# Patient Record
Sex: FEMALE | Race: WHITE | NOT HISPANIC OR LATINO | URBAN - METROPOLITAN AREA
[De-identification: names, ages, dates, MRNs, and addresses within clinical notes are randomized per-mention and may not be internally consistent; named-entity substitution may affect disease eponyms.]

---

## 2018-03-21 ENCOUNTER — EMERGENCY (EMERGENCY)
Facility: HOSPITAL | Age: 53
LOS: 1 days | Discharge: HOME | End: 2018-03-21
Attending: EMERGENCY MEDICINE
Payer: COMMERCIAL

## 2018-03-21 VITALS — HEIGHT: 64 IN | WEIGHT: 184.97 LBS

## 2018-03-21 VITALS
SYSTOLIC BLOOD PRESSURE: 138 MMHG | DIASTOLIC BLOOD PRESSURE: 86 MMHG | HEART RATE: 134 BPM | RESPIRATION RATE: 16 BRPM | OXYGEN SATURATION: 94 % | TEMPERATURE: 98 F

## 2018-03-21 PROCEDURE — 93931 UPPER EXTREMITY STUDY: CPT

## 2018-03-21 PROCEDURE — 99284 EMERGENCY DEPT VISIT MOD MDM: CPT | Mod: 25

## 2018-03-21 PROCEDURE — 93931 UPPER EXTREMITY STUDY: CPT | Mod: 26,LT

## 2018-03-21 PROCEDURE — 99284 EMERGENCY DEPT VISIT MOD MDM: CPT

## 2018-03-21 NOTE — ED PROVIDER NOTE - OBJECTIVE STATEMENT
51 y/o F with PMHx of discoid lupus (not followed by rheumatologist) presents to ED c/o L hand pain, numbness, tingling that radiates into the arm and shoulder onset several months ago that has worsened over the past 2 days. She states her hand has been turning blue, which prompted she come to the ED. Pt was seen by orthopedist, has had cortisone shots, MRI and was told she has tendonitis. Was also seen by neurologist, who suggest she get an MRI of her brain but she did not have imaging done. Was taking Gabapentin, but stopped because it provided no relief. NKDA. Denies FHx of clotting problems, fevers, chills, deformity of the hand, neck pain, back pain, CP, SOB or recent trauma. No further complaints at this time. 51 y/o F with PMHx of discoid lupus (not followed by rheumatologist) presents to ED c/o L hand pain, numbness, tingling that radiates into the arm and shoulder onset several months ago that has worsened over the past 2 days. She states her hand has been turning blue and feels colder than he R hand, which prompted she come to the ED. Pt was seen by orthopedist, has had cortisone shots, MRI and was told she has tendonitis. Was also seen by neurologist, who suggest she get an MRI of her brain but she did not have imaging done. Was taking Gabapentin, but stopped because it provided no relief. NKDA. Denies FHx of clotting problems, fevers, chills, deformity of the hand, neck pain, back pain, CP, SOB or recent trauma. No further complaints at this time. 51 y/o F with PMHx of discoid lupus (not followed by rheumatologist) presents to ED c/o L hand pain, numbness, tingling that radiates into the arm and shoulder onset in November that has worsened over the past 2 days. She states her hand has been turning blue and feels colder than he R hand, which prompted she come to the ED today. Pt was seen by orthopedist, has had cortisone shots (with no relief), an MRI and was told she has tendonitis. Was also seen by neurologist, who suggest she get an MRI of her brain but she did not have imaging done. Was taking Gabapentin, but stopped because it provided no relief. NKDA. Denies FHx or PMHx of clotting problems, fevers, chills, deformity of the hand, neck pain, back pain, CP, SOB or recent trauma. No further complaints at this time.

## 2018-03-21 NOTE — ED ADULT TRIAGE NOTE - CHIEF COMPLAINT QUOTE
pt presents to ED with left forearm pain and discoloration of arm turning blue at times x 5 months. pt has seen PMD and has MRI's with negative findings. pt has seen a neurologist with negative findings. no discoloration noted at this time

## 2018-03-21 NOTE — ED PROVIDER NOTE - NEUROLOGICAL, MLM
Alert and oriented, no focal deficits, no motor or sensory deficits. Neurovascularly intact. Reflexes, sensation and strength intact. Vascular supply demonstrated.
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
## 2018-03-21 NOTE — ED PROVIDER NOTE - ATTENDING CONTRIBUTION TO CARE
Pt. well appearing. NO focal deficit. Normal gait. NO weakness to upper extremities. I have discussed the plan with the ACP.

## 2018-03-21 NOTE — ED PROVIDER NOTE - MUSCULOSKELETAL, MLM
Spine appears normal, range of motion is not limited, no muscle or joint tenderness. Can move all extremities.

## 2018-03-21 NOTE — ED PROVIDER NOTE - PROGRESS NOTE DETAILS
PT seen resting comfortably in no acute distress, no acute complaints at this time, PT tolerating PO intake,  PT informed of all results, pt informed of possibility of change in radiology read after official read, PT will be DC home with follow up to rhumartalogy, vascular, pt educated about need for further out pt evaluation. PT educated about when to return to the ED if needed. PT verbalizes that he understands all instructions and results

## 2018-03-21 NOTE — ED PROVIDER NOTE - SKIN, MLM
Skin normal color for race, warm, dry and intact. No evidence of rash. No signs of trauma or ecchymosis, erythema or edema. Skin normal color for race, dry and intact. No evidence of rash. No signs of trauma or ecchymosis or edema. L hand is slightly more cool to the touch than R hand. Skin normal color for race, dry and intact. No evidence of rash. No signs of trauma or ecchymosis or edema. L hand is slightly more cool to the touch and erythematous than R hand.

## 2018-05-25 ENCOUNTER — EMERGENCY (EMERGENCY)
Facility: HOSPITAL | Age: 53
LOS: 1 days | Discharge: HOME | End: 2018-05-25
Attending: EMERGENCY MEDICINE
Payer: COMMERCIAL

## 2018-05-25 VITALS
DIASTOLIC BLOOD PRESSURE: 83 MMHG | RESPIRATION RATE: 16 BRPM | SYSTOLIC BLOOD PRESSURE: 145 MMHG | WEIGHT: 175.93 LBS | OXYGEN SATURATION: 98 % | HEART RATE: 74 BPM | HEIGHT: 65 IN | TEMPERATURE: 98 F

## 2018-05-25 PROCEDURE — 99283 EMERGENCY DEPT VISIT LOW MDM: CPT

## 2018-05-25 PROCEDURE — 70330 X-RAY EXAM OF JAW JOINTS: CPT

## 2018-05-25 PROCEDURE — 99284 EMERGENCY DEPT VISIT MOD MDM: CPT

## 2018-05-25 PROCEDURE — 73130 X-RAY EXAM OF HAND: CPT | Mod: 26,LT

## 2018-05-25 PROCEDURE — 73130 X-RAY EXAM OF HAND: CPT

## 2018-05-25 PROCEDURE — 70330 X-RAY EXAM OF JAW JOINTS: CPT | Mod: 26

## 2018-05-25 RX ORDER — ACETAMINOPHEN 500 MG
650 TABLET ORAL ONCE
Qty: 0 | Refills: 0 | Status: COMPLETED | OUTPATIENT
Start: 2018-05-25 | End: 2018-05-25

## 2018-05-25 RX ORDER — IBUPROFEN 200 MG
600 TABLET ORAL ONCE
Qty: 0 | Refills: 0 | Status: COMPLETED | OUTPATIENT
Start: 2018-05-25 | End: 2018-05-25

## 2018-05-25 RX ADMIN — Medication 600 MILLIGRAM(S): at 17:32

## 2018-05-25 RX ADMIN — Medication 650 MILLIGRAM(S): at 18:31
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
## 2018-05-25 NOTE — ED PROVIDER NOTE - CONSTITUTIONAL, MLM
normal... Well appearing, well nourished, awake, alert, oriented to person, place, time/situation and in mild emotional distress
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
## 2018-05-25 NOTE — ED PROVIDER NOTE - SKIN, MLM
5x4 abrasion to RLE. contusion to left posterior lower extremity distally. skin abrasion to right distal medial forearm. contusion to dorsal surface of right hand medially, tender no deformity

## 2019-04-28 NOTE — ED ADULT NURSE NOTE - MUSCULOSKELETAL ASSESSMENT
1045:  Pt arrived ambulatory and in no distress for invanz iv. Assessment completed. No new complaints voiced. Vital Signs Patient Vitals for the past 12 hrs: 
 Temp Pulse Resp BP  
04/28/19 1047 97 °F (36.1 °C) 63 16 150/81 Medications: 
Invanz 1g IV over 30 mins 1115: Discharged home ambulatory and in no distress, accompanied by .  Next appointment 4/29/19 4pm. 
 WDL

## 2019-07-01 ENCOUNTER — APPOINTMENT (OUTPATIENT)
Dept: ORTHOPEDIC SURGERY | Facility: CLINIC | Age: 54
End: 2019-07-01

## 2019-08-02 ENCOUNTER — OUTPATIENT (OUTPATIENT)
Dept: OUTPATIENT SERVICES | Facility: HOSPITAL | Age: 54
LOS: 1 days | End: 2019-08-02
Payer: COMMERCIAL

## 2019-08-02 VITALS
OXYGEN SATURATION: 98 % | SYSTOLIC BLOOD PRESSURE: 129 MMHG | RESPIRATION RATE: 16 BRPM | HEART RATE: 72 BPM | TEMPERATURE: 98 F | DIASTOLIC BLOOD PRESSURE: 67 MMHG | WEIGHT: 188.94 LBS

## 2019-08-02 DIAGNOSIS — Z98.890 OTHER SPECIFIED POSTPROCEDURAL STATES: Chronic | ICD-10-CM

## 2019-08-02 DIAGNOSIS — Z01.818 ENCOUNTER FOR OTHER PREPROCEDURAL EXAMINATION: ICD-10-CM

## 2019-08-02 DIAGNOSIS — D10.4 BENIGN NEOPLASM OF TONSIL: ICD-10-CM

## 2019-08-02 DIAGNOSIS — Z87.19 PERSONAL HISTORY OF OTHER DISEASES OF THE DIGESTIVE SYSTEM: ICD-10-CM

## 2019-08-02 LAB
ALBUMIN SERPL ELPH-MCNC: 4 G/DL — SIGNIFICANT CHANGE UP (ref 3.3–5)
ALP SERPL-CCNC: 55 U/L — SIGNIFICANT CHANGE UP (ref 40–120)
ALT FLD-CCNC: 55 U/L — SIGNIFICANT CHANGE UP (ref 12–78)
ANION GAP SERPL CALC-SCNC: 2 MMOL/L — LOW (ref 5–17)
APTT BLD: 41.6 SEC — HIGH (ref 28.5–37)
AST SERPL-CCNC: 25 U/L — SIGNIFICANT CHANGE UP (ref 15–37)
BILIRUB SERPL-MCNC: 0.5 MG/DL — SIGNIFICANT CHANGE UP (ref 0.2–1.2)
BUN SERPL-MCNC: 15 MG/DL — SIGNIFICANT CHANGE UP (ref 7–23)
CALCIUM SERPL-MCNC: 9.4 MG/DL — SIGNIFICANT CHANGE UP (ref 8.5–10.1)
CHLORIDE SERPL-SCNC: 108 MMOL/L — SIGNIFICANT CHANGE UP (ref 96–108)
CO2 SERPL-SCNC: 31 MMOL/L — SIGNIFICANT CHANGE UP (ref 22–31)
CREAT SERPL-MCNC: 0.83 MG/DL — SIGNIFICANT CHANGE UP (ref 0.5–1.3)
GLUCOSE SERPL-MCNC: 99 MG/DL — SIGNIFICANT CHANGE UP (ref 70–99)
HCG UR QL: NEGATIVE — SIGNIFICANT CHANGE UP
HCT VFR BLD CALC: 40 % — SIGNIFICANT CHANGE UP (ref 34.5–45)
HGB BLD-MCNC: 13.6 G/DL — SIGNIFICANT CHANGE UP (ref 11.5–15.5)
MCHC RBC-ENTMCNC: 31.6 PG — SIGNIFICANT CHANGE UP (ref 27–34)
MCHC RBC-ENTMCNC: 34 GM/DL — SIGNIFICANT CHANGE UP (ref 32–36)
MCV RBC AUTO: 92.8 FL — SIGNIFICANT CHANGE UP (ref 80–100)
NRBC # BLD: 0 /100 WBCS — SIGNIFICANT CHANGE UP (ref 0–0)
PLATELET # BLD AUTO: 184 K/UL — SIGNIFICANT CHANGE UP (ref 150–400)
POTASSIUM SERPL-MCNC: 3.8 MMOL/L — SIGNIFICANT CHANGE UP (ref 3.5–5.3)
POTASSIUM SERPL-SCNC: 3.8 MMOL/L — SIGNIFICANT CHANGE UP (ref 3.5–5.3)
PROT SERPL-MCNC: 7.4 G/DL — SIGNIFICANT CHANGE UP (ref 6–8.3)
RBC # BLD: 4.31 M/UL — SIGNIFICANT CHANGE UP (ref 3.8–5.2)
RBC # FLD: 12.4 % — SIGNIFICANT CHANGE UP (ref 10.3–14.5)
SODIUM SERPL-SCNC: 141 MMOL/L — SIGNIFICANT CHANGE UP (ref 135–145)
WBC # BLD: 4.37 K/UL — SIGNIFICANT CHANGE UP (ref 3.8–10.5)
WBC # FLD AUTO: 4.37 K/UL — SIGNIFICANT CHANGE UP (ref 3.8–10.5)

## 2019-08-02 PROCEDURE — 93005 ELECTROCARDIOGRAM TRACING: CPT

## 2019-08-02 PROCEDURE — 86850 RBC ANTIBODY SCREEN: CPT

## 2019-08-02 PROCEDURE — 86900 BLOOD TYPING SEROLOGIC ABO: CPT

## 2019-08-02 PROCEDURE — 80053 COMPREHEN METABOLIC PANEL: CPT

## 2019-08-02 PROCEDURE — 36415 COLL VENOUS BLD VENIPUNCTURE: CPT

## 2019-08-02 PROCEDURE — 86901 BLOOD TYPING SEROLOGIC RH(D): CPT

## 2019-08-02 PROCEDURE — G0463: CPT

## 2019-08-02 PROCEDURE — 93010 ELECTROCARDIOGRAM REPORT: CPT

## 2019-08-02 PROCEDURE — 85027 COMPLETE CBC AUTOMATED: CPT

## 2019-08-02 PROCEDURE — 85730 THROMBOPLASTIN TIME PARTIAL: CPT

## 2019-08-02 PROCEDURE — 81025 URINE PREGNANCY TEST: CPT

## 2019-08-02 NOTE — H&P PST ADULT - SKIN/BREAST COMMENTS
H/x of discoid lupus for the last 20 years exacerbated by heat and rash. Pt not using any Rx ointment.

## 2019-08-02 NOTE — H&P PST ADULT - RS GEN PE MLT RESP DETAILS PC
airway patent/breath sounds equal/good air movement/respirations non-labored/clear to auscultation bilaterally/normal

## 2019-08-02 NOTE — H&P PST ADULT - HISTORY OF PRESENT ILLNESS
55 yo female with PMH of GERD here for PST. Pt complaining of occasional voice hoarseness and sore throat since 6/2019. Pt noticed "something in the back of left throat". Pt seen by ENT and referred for CT scan - left tonsillar cyst. Pt denies dysphagia, throat pain, recent tonsillitis and strep throat infections. Pt electing for left tonsillectomy on 8/7/19.

## 2019-08-02 NOTE — H&P PST ADULT - NSICDXPASTMEDICALHX_GEN_ALL_CORE_FT
PAST MEDICAL HISTORY:  Discoid lupus (Pt states she has had the condition for the last 20 years exacerbated by heat and sun) PAST MEDICAL HISTORY:  Discoid lupus (Pt states she has had the condition for the last 20 years exacerbated by heat and sun)    H/O benign neoplasm of tonsil

## 2019-08-02 NOTE — H&P PST ADULT - NSICDXPROBLEM_GEN_ALL_CORE_FT
PROBLEM DIAGNOSES  Problem: Preoperative testing  Assessment and Plan: Medical clearance needed as per surgeon. CBC, Comprehensive panel, PT/PTT, T&S and UCG ordered. Pre-op instructions given and pt verbalized understanding.      Problem: H/O benign neoplasm of tonsil  Assessment and Plan: Left tonsillectomy on 8/7/19.

## 2019-08-02 NOTE — H&P PST ADULT - NEGATIVE CARDIOVASCULAR SYMPTOMS
no paroxysmal nocturnal dyspnea/no orthopnea/no chest pain/no peripheral edema/no claudication/no palpitations/no dyspnea on exertion

## 2019-08-02 NOTE — H&P PST ADULT - NSANTHOSAYNRD_GEN_A_CORE
No. SURYA screening performed.  STOP BANG Legend: 0-2 = LOW Risk; 3-4 = INTERMEDIATE Risk; 5-8 = HIGH Risk

## 2019-08-06 ENCOUNTER — TRANSCRIPTION ENCOUNTER (OUTPATIENT)
Age: 54
End: 2019-08-06

## 2019-08-07 ENCOUNTER — OUTPATIENT (OUTPATIENT)
Dept: OUTPATIENT SERVICES | Facility: HOSPITAL | Age: 54
LOS: 1 days | End: 2019-08-07
Payer: COMMERCIAL

## 2019-08-07 ENCOUNTER — RESULT REVIEW (OUTPATIENT)
Age: 54
End: 2019-08-07

## 2019-08-07 VITALS
OXYGEN SATURATION: 99 % | RESPIRATION RATE: 15 BRPM | DIASTOLIC BLOOD PRESSURE: 78 MMHG | HEART RATE: 59 BPM | TEMPERATURE: 98 F | WEIGHT: 188.94 LBS | SYSTOLIC BLOOD PRESSURE: 102 MMHG

## 2019-08-07 VITALS
HEART RATE: 60 BPM | RESPIRATION RATE: 14 BRPM | SYSTOLIC BLOOD PRESSURE: 121 MMHG | OXYGEN SATURATION: 100 % | DIASTOLIC BLOOD PRESSURE: 59 MMHG

## 2019-08-07 DIAGNOSIS — Z98.890 OTHER SPECIFIED POSTPROCEDURAL STATES: Chronic | ICD-10-CM

## 2019-08-07 DIAGNOSIS — Z01.818 ENCOUNTER FOR OTHER PREPROCEDURAL EXAMINATION: ICD-10-CM

## 2019-08-07 DIAGNOSIS — D10.4 BENIGN NEOPLASM OF TONSIL: ICD-10-CM

## 2019-08-07 PROCEDURE — 88304 TISSUE EXAM BY PATHOLOGIST: CPT

## 2019-08-07 PROCEDURE — 42826 REMOVAL OF TONSILS: CPT

## 2019-08-07 PROCEDURE — 88304 TISSUE EXAM BY PATHOLOGIST: CPT | Mod: 26

## 2019-08-07 RX ORDER — SODIUM CHLORIDE 9 MG/ML
1000 INJECTION, SOLUTION INTRAVENOUS
Refills: 0 | Status: DISCONTINUED | OUTPATIENT
Start: 2019-08-07 | End: 2019-08-07

## 2019-08-07 RX ORDER — RANITIDINE HYDROCHLORIDE 150 MG/1
1 TABLET, FILM COATED ORAL
Qty: 0 | Refills: 0 | DISCHARGE

## 2019-08-07 RX ORDER — HYDROMORPHONE HYDROCHLORIDE 2 MG/ML
0.5 INJECTION INTRAMUSCULAR; INTRAVENOUS; SUBCUTANEOUS ONCE
Refills: 0 | Status: DISCONTINUED | OUTPATIENT
Start: 2019-08-07 | End: 2019-08-07

## 2019-08-07 RX ORDER — ACETAMINOPHEN 500 MG
2 TABLET ORAL
Qty: 0 | Refills: 0 | DISCHARGE

## 2019-08-07 RX ORDER — ACETAMINOPHEN 500 MG
1000 TABLET ORAL ONCE
Refills: 0 | Status: COMPLETED | OUTPATIENT
Start: 2019-08-07 | End: 2019-08-07

## 2019-08-07 RX ORDER — IBUPROFEN 200 MG
1 TABLET ORAL
Qty: 0 | Refills: 0 | DISCHARGE

## 2019-08-07 RX ORDER — ONDANSETRON 8 MG/1
4 TABLET, FILM COATED ORAL ONCE
Refills: 0 | Status: COMPLETED | OUTPATIENT
Start: 2019-08-07 | End: 2019-08-07

## 2019-08-07 RX ADMIN — Medication 400 MILLIGRAM(S): at 13:39

## 2019-08-07 RX ADMIN — SODIUM CHLORIDE 75 MILLILITER(S): 9 INJECTION, SOLUTION INTRAVENOUS at 13:39

## 2019-08-07 RX ADMIN — ONDANSETRON 4 MILLIGRAM(S): 8 TABLET, FILM COATED ORAL at 14:22

## 2019-08-07 RX ADMIN — Medication 1000 MILLIGRAM(S): at 13:49

## 2019-08-07 RX ADMIN — SODIUM CHLORIDE 40 MILLILITER(S): 9 INJECTION, SOLUTION INTRAVENOUS at 11:07

## 2019-08-07 NOTE — ASU DISCHARGE PLAN (ADULT/PEDIATRIC) - CARE PROVIDER_API CALL
Shankar Morgan)  Otolaryngology  [address] [place] [address]  Phone: [phone]  Fax: [phone]  Follow Up Time:

## 2019-08-07 NOTE — ASU PREOP CHECKLIST - PATIENT'S PERSONAL PROPERTY GIVEN TO
Akbar Santana MD at 8/9/2017 11:20 AM     Status: Sign at close encounter      SURGEON:  Akbar Santana MD  COSURGEON:  None.  DATE OF SURGERY:  TBD  START TIME:  To follow first cases  DIAGNOSIS:    1. Abdominal pain, RUQ (right upper quadrant)    2. Gallstones        PROCEDURE:  Laparoscopic cholecystectomy, possible cholangiogram, possible open procedure and any other indicated procedure  LENGTH:  120 min (2 hours)  ANESTHESIA: General anesthesia  POSITION:  supine  SPECIAL EQUIPMENT/NEEDS:    Surgical assist.  Fluoroscopy.     H&P PHYSICIAN:  Primary care provider on TBD.  TYPE OF ADMIT:  Ambulatory  PREOP ANTIBIOTICS: Ancef 2 g IV on call to OR, ok to change based on weight per protocol.  DAY OF LABS:  As per primary provider and anesthesia  BLOOD:  None.  OTHER ORDERS:  Bilateral SCDs on call to the OR.           ALLERGIES:   Allergen Reactions   • Hay Fever & [Chlorpheniramine-Ppa Cr] Other (See Comments)       Gets sneezing and runny nose for reaction   • Sulfa Antibiotics VOMITING          Annel Figueroa MA at 8/9/2017 12:50 PM     Status: Signed      Date of Surgery: 8-14-17 at 08:30am   Pre op Exam: 8-9-17 @ 9:40 am  H & P: 7-31-17@12:00pm Dr. Schmitt   Consent signed: 8-9-17   Postop visit: 8-30-17 @9:50am  Latex Allergy: NO  Diabetic: NO   Sleep Apnea: NO   SOO: Yes - email address [email or web address]        Martita Brian  MRN:   [id number]       on unit

## 2019-08-07 NOTE — ASU DISCHARGE PLAN (ADULT/PEDIATRIC) - CALL YOUR DOCTOR IF YOU HAVE ANY OF THE FOLLOWING:
Bleeding that does not stop Fever greater than (need to indicate Fahrenheit or Celsius)/Nausea and vomiting that does not stop/Unable to urinate/Pain not relieved by Medications/Bleeding that does not stop

## 2019-08-07 NOTE — ASU PREOP CHECKLIST - WAS PATIENT ON BETA BLOCKER?
PT ACUTE  Discharge Note          Pt seen on MRICU nursing unit.                                                Frequency Comments: D/C PT, met goals.                                                                                                                 Admitting complaint: Neck swelling [R22.1]  Airway compromise [J98.8]                                     Precautions  Other Precautions: s/p anterior cervical discectomy fusion cervical 3-4, 4-5 with C4 corpectomy(7/24/2017) (07/30/17 1434)    SUBJECTIVE: Patient's Personal Goal: return home (07/30/17 1434)  Subjective: pt agrees to PT session. \"I like to walk, I've been resting and laying all day (07/31/17 1019)  Subjective/Objective Comments: collaborated with RN Rock during session.  pt in chair with needs in reach (07/31/17 1019)    OBJECTIVE:  Basic Lines: Telemetry;Continuous pulse oximetry (07/31/17 1019)  Safety Measures: Alarms (07/31/17 1019)    RN reported Gomez Fall Scale Score: 35    ASSESSMENT:   Pt seen for PT treatment session today.  Therapeutic activities completed today include progression of gait trial, assessment of stairs.  Pt is overall functioning at modified independent to independent level for transfers, gait and stair trial.   Patient is demonstrating good progress supported by pt has met all goals, appropriate for d/c PT.  Pt encouraged to keep walking in bowie to maintain mobility.  Anticipate D/C to home when medically stable.               Other Therapeutic Intervention: Patient educated in daily POC, role of PT in acute setting. Collaborated with patient to formulate therapeutic goals to facilitate safe return home per patient's request (07/30/17 1434)     EDUCATION:   On this date, the patient was educated on  See above: maintain mobility by walking in bowie.    The response to education was: Verbalizes understanding    PT Identified Barriers to Discharge: no PT barriers     PLAN:   Continue skilled PT, including the  following Treatment/Interventions: Functional transfer training;Strengthening;Endurance training;Equipment eval/education;Bed mobility;Gait training;Compensatory technique education;Stairs retraining;Safety Education;Neuromuscular re-education (07/30/17 1434)   Frequency Comments: D/C PT, met goals.   (07/31/17 1019)    Treatment Plan for Next Session: d/C PT, maintain mobility with nursing staff.            RECOMMENDATIONS FOR DISCHARGE:  Recommendation for Discharge: PT: Home (07/31/17 1019)    PT/OT Mobility Equipment for Discharge: no needs (07/31/17 1019)  PT/OT ADL Equipment for Discharge: continue to assess (07/30/17 1434)    ICU Mobility Assesment (PERME):       Last 24 hours of Functional Data  Bed Mobility   Bed Mobility  Bed Mobility Comments: Not assessed, patient started and ended session in recliner per request (07/30/17 1434)    Transfers  Transfers  Sit to Stand: Modified Independent (07/31/17 1019)  Stand to Sit: Modified Independent (07/31/17 1019)  Stand Pivot Transfers: Minimal Assist (Min) (07/30/17 1434)  Toilet Transfers: Minimal Assist (Min) (07/30/17 1434)  Assistive Device/: 1 Person;Gait Belt (07/31/17 1019)  Transfer Comments 1: safe transfers, use of UE appropriately (07/31/17 1019)      Gait  Gait  Gait Assistance: Independent (07/31/17 1019)  Assistive Device/: 1 Person;Gait Belt (07/31/17 1019)  Ambulation Distance (Feet): 600 Feet (07/31/17 1019)  Pattern: Foot flat R;Foot flat L (07/31/17 1019)  Ambulation Surface: Tile (07/31/17 1019)  Gait Comments 1: safe gait without A/D.  fair pace, No LOB (07/31/17 1019),      Stairs  Stairs Mobility  Number of Stairs: 14 (07/31/17 1019)  Stair Management Assistance: Modified Independent (07/31/17 1019)  Stair Management Technique: One rail R;Alternating pattern (07/31/17 1019)  Stairs Mobility Comments: smooth sequencing ascent and descent.  no LOB (07/31/17 1019)       Neuromuscular Re-education        Balance  Balance  Sitting - Static: Supversion (Supv) (07/30/17 1434)  Sitting - Dynamic: Supervision (Supv) (07/30/17 1434)  Standing - Static: Independent (07/31/17 1019)  Standing - Dynamic (eyes open): Independent (07/31/17 1019)  Balance Comments #1: pt able retreive object from floor and completes tandem gait without LOB (07/31/17 1019)    Wheelchair Mobility       Patient's Personal Goal: return home (07/30/17 1434)    Therapy Goals:    Goals  Short Term Goals to Be Reviewed On: 08/06/17 (07/30/17 1434)  Short Term Goals = Discharge Goals: Yes (07/30/17 1434)  Goal Agreement: Patient agrees with goals and treatment plan (07/30/17 1434)  Bed Mobility Discharge Goal: Patient will perform all flat bed mobility at modified independence without use of bed railing (07/30/17 1434)  Transfer Discharge Goal: Patient will perform all transfers from multiple surface heights at modified independence with least restrictive device as needed (07/31/17 1019)  Transfer Discharge Goal Progress: Outcome met, complete goal (07/31/17 1019)  Ambulation Discharge Goal: Patient will ambulate approximately 300 feet at modified independence with least restrictive device as needed (07/31/17 1019)  Ambulation Discharge Goal Progress: Outcome met, complete goal (07/31/17 1019)  Stairs Discharge Goal: Patient will negotiate 10 stairs, one railing at modified independence to simulate home entrance (otherwise can enter from street level through sliding glass doors/not used as often) (07/31/17 1019)  Stairs Discharge Goal Progress: Outcome met, complete goal (07/31/17 1019)        PT Time Spent: 40 minutes (07/31/17 1019)    See PT flowsheet for full details regarding the PT therapy provided.         No

## 2019-08-09 LAB — SURGICAL PATHOLOGY STUDY: SIGNIFICANT CHANGE UP

## 2019-09-25 PROBLEM — Z87.19 PERSONAL HISTORY OF OTHER DISEASES OF THE DIGESTIVE SYSTEM: Chronic | Status: ACTIVE | Noted: 2019-08-02

## 2019-09-25 PROBLEM — L93.0 DISCOID LUPUS ERYTHEMATOSUS: Chronic | Status: ACTIVE | Noted: 2018-03-21

## 2019-09-28 ENCOUNTER — APPOINTMENT (OUTPATIENT)
Dept: ORTHOPEDIC SURGERY | Facility: CLINIC | Age: 54
End: 2019-09-28
Payer: COMMERCIAL

## 2019-09-28 VITALS
SYSTOLIC BLOOD PRESSURE: 128 MMHG | DIASTOLIC BLOOD PRESSURE: 80 MMHG | HEIGHT: 64 IN | WEIGHT: 185 LBS | BODY MASS INDEX: 31.58 KG/M2 | HEART RATE: 66 BPM

## 2019-09-28 DIAGNOSIS — M23.91 UNSPECIFIED INTERNAL DERANGEMENT OF RIGHT KNEE: ICD-10-CM

## 2019-09-28 DIAGNOSIS — M25.561 PAIN IN RIGHT KNEE: ICD-10-CM

## 2019-09-28 PROCEDURE — 99204 OFFICE O/P NEW MOD 45 MIN: CPT

## 2019-09-28 PROCEDURE — 73562 X-RAY EXAM OF KNEE 3: CPT | Mod: RT

## 2019-09-28 NOTE — PHYSICAL EXAM
[de-identified] : Right Knee: \par \par General: Alert and oriented x3.  In no acute distress.  Pleasant in nature with a normal affect.  No apparent respiratory distress. \par \par Erythema, Warmth, Rubor: Negative\par Swelling/Edema: Negative \par ROM: She has a 15 degree extension lag and can flex the knee to 120 degrees; pain past 120 degrees of flexion. \par Deanne's Test: Positive\par Medial Joint Line TTP: Positive\par Lateral Joint Line TTP: Negative \par Lachman Exam/Anterior Drawer/Pivot Shift Test: Negative \par MCL Pain: Negative\par LCL Pain: Negative\par Iliotibial Band Pain: Negative\par Patellofemoral Joint Pain: Negative\par Pes Anserinus TTP: Negative\par Homans Sign: Negative\par Neurovascularly: Intact with no sensory or motor deficits\par \par \par  [de-identified] : 3 views x-rays taken of the right knee today, 9/28/19 shows: very mild medial compartment narrowing.  No other osseous abnormalities.

## 2019-09-28 NOTE — HISTORY OF PRESENT ILLNESS
[de-identified] : Dunia is a 54 y.o. female who presents with right knee pain since July 2019.  She had no trauma to the knee but she has been in constant pain with a pain scale today of 9/10.  She states that the knee has been constantly locking, buckling and catching on her.  She states that she cannot extend her leg fully and placing full weight on the knee hurts.  She likes to work out in a gym and she unable to do so due to the knee.  She has no other complaints today in office.

## 2019-09-28 NOTE — DISCUSSION/SUMMARY
[de-identified] : Assessment: Right knee internal derangement. \par \par Plan:\par 1. MRI of the right knee to r/o meniscal tear.\par 2. Knee runner brace given to the patient today in office for knee support.  \par 3. Referral to Dr. [facility] post MRI to discuss knee and findings. \par 4. She can continue OTC medications NSAIDs/Tylenol for pain.\par 5. All of her questions were answered and she understands and agrees with treatment plan.

## 2019-10-02 ENCOUNTER — FORM ENCOUNTER (OUTPATIENT)
Age: 54
End: 2019-10-02

## 2019-10-02 PROBLEM — M23.91 INTERNAL DERANGEMENT OF KNEE, RIGHT: Status: ACTIVE | Noted: 2019-09-28

## 2019-10-03 ENCOUNTER — OUTPATIENT (OUTPATIENT)
Dept: OUTPATIENT SERVICES | Facility: HOSPITAL | Age: 54
LOS: 1 days | End: 2019-10-03
Payer: COMMERCIAL

## 2019-10-03 ENCOUNTER — APPOINTMENT (OUTPATIENT)
Dept: MRI IMAGING | Facility: CLINIC | Age: 54
End: 2019-10-03
Payer: COMMERCIAL

## 2019-10-03 DIAGNOSIS — M23.91 UNSPECIFIED INTERNAL DERANGEMENT OF RIGHT KNEE: ICD-10-CM

## 2019-10-03 DIAGNOSIS — M25.561 PAIN IN RIGHT KNEE: ICD-10-CM

## 2019-10-03 DIAGNOSIS — Z98.890 OTHER SPECIFIED POSTPROCEDURAL STATES: Chronic | ICD-10-CM

## 2019-10-03 DIAGNOSIS — Z00.00 ENCOUNTER FOR GENERAL ADULT MEDICAL EXAMINATION WITHOUT ABNORMAL FINDINGS: ICD-10-CM

## 2019-10-03 PROCEDURE — 73721 MRI JNT OF LWR EXTRE W/O DYE: CPT | Mod: 26,RT

## 2019-10-03 PROCEDURE — 73721 MRI JNT OF LWR EXTRE W/O DYE: CPT

## 2019-10-21 ENCOUNTER — APPOINTMENT (OUTPATIENT)
Dept: ORTHOPEDIC SURGERY | Facility: CLINIC | Age: 54
End: 2019-10-21
Payer: COMMERCIAL

## 2019-10-21 VITALS
HEART RATE: 69 BPM | DIASTOLIC BLOOD PRESSURE: 67 MMHG | BODY MASS INDEX: 31.58 KG/M2 | WEIGHT: 185 LBS | SYSTOLIC BLOOD PRESSURE: 112 MMHG | HEIGHT: 64 IN

## 2019-10-21 DIAGNOSIS — S83.241A OTHER TEAR OF MEDIAL MENISCUS, CURRENT INJURY, RIGHT KNEE, INITIAL ENCOUNTER: ICD-10-CM

## 2019-10-21 PROCEDURE — 20610 DRAIN/INJ JOINT/BURSA W/O US: CPT | Mod: RT

## 2019-10-21 PROCEDURE — 99214 OFFICE O/P EST MOD 30 MIN: CPT | Mod: 25
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
## 2019-10-21 NOTE — PHYSICAL EXAM
[de-identified] : Physical Exam:\par General: Well appearing, no acute distress\par Neurologic: A&Ox3, No focal deficits\par Head: NCAT without abrasions, lacerations, or ecchymosis to head, face, or scalp\par Eyes: No scleral icterus, no gross abnormalities\par Respiratory: Equal chest wall expansion bilaterally, no accessory muscle use\par Lymphatic: No lymphadenopathy palpated\par Skin: Warm and dry\par Psychiatric: Normal mood and affect\par \par Right knee\par \par Inspection/Palpation: Gait evaluation does reveal a limp. There is no inguinal adenopathy. Limb is well-perfused, without skin lesions, shows a grossly normal motor and sensory examination. \par ROM: The Right knee motion is significantly reduced and does cause significant pain. The knee exhibits a moderate effusion. \par Muscle/Nerves: Quad strength decreased secondary to injury. Motor exam 5/ distally, EHL/FHL/GSC/TA\par SILT L4-S1\par Special Tests: \par Joint line tenderness noted [medial/lateral] joint line at 0 and 90 degrees. \par Positive Deanne test. Positive Appley grind test\par Stable in varus and valgus stress at 0 and 30 degrees\par Negative posterior drawer. \par The knee has a negative Lachman and negative anterior drawer.\par Normal hip and ankle exam. \par \par Left Knee\par \par Inspection/Palpation: There is no inguinal adenopathy. Well perfused, without skin lesions, shows a grossly normal motor and sensory examination. \par ROM: Normal\par Muscle/Nerves: Quad strength decreased secondary to injury. Motor exam 5/ distally, EHL/FHL/GSC/TA\par SILT L4-S1\par Special Tests: \par No Joint line tenderness noted  at medial and lateral joint line at 0 and 90 degrees. \par Stable in varus and valgus stress at 0 and 30 degrees\par Negative posterior drawer. \par Negative anterior drawer and stable Lachman \par Normal hip and ankle exam.\par  [de-identified] : MRI to right knee reveals: \par 1. Horizontal tear of the anterior root/horn of the medial meniscus with associated parameniscal \par cyst formation. \par 2. Mild osteoarthritis of the knee joint involving the patellofemoral and medial tibiofemoral \par compartments, as described above.

## 2019-10-21 NOTE — PROCEDURE
[de-identified] : Injection: Right knee joint.\par Indication: Medial Meniscus Tear\par \par A discussion was had with the patient regarding this procedure and all questions were answered. All risks, benefits and alternatives were discussed. These included but were not limited to bleeding, infection, and allergic reaction. Alcohol was used to clean the skin, and betadine was used to sterilize and prep the area in the supero-lateral aspect of the right knee. Ethyl chloride spray was then used as a topical anesthetic. A 22-gauge needle was used to inject 3cc of 1% Xylocaine, 2cc of 0.25% Bupivacaine and 1cc of 40mg/mL Triamcinolone Acetonide into the right knee. A sterile bandage was then applied. The patient tolerated the procedure well and there were no complications\par

## 2019-12-03 ENCOUNTER — APPOINTMENT (OUTPATIENT)
Dept: ORTHOPEDIC SURGERY | Facility: CLINIC | Age: 54
End: 2019-12-03

## 2019-12-31 NOTE — ED PROVIDER NOTE - NS_ ATTENDINGSCRIBEDETAILS _ED_A_ED_FT
The history, relevant review of systems, past medical and surgical history, medical decision making, and physical examination was documented by the scribe in my presence and I attest to the accuracy of the documentation.
Adequate: hears normal conversation without difficulty

## 2020-10-10 NOTE — H&P PST ADULT - TEMPERATURE IN FAHRENHEIT (DEGREES F)
Gen: Alert, NAD, well appearing  Head: NC, AT, PERRL, EOMI, normal lids/conjunctiva  ENT: normal hearing, + dried blood in right nare. ? polyp noted to right  medial nare. No active bleeding. Patent oropharynx without erythema/exudate. +dried blood to inside mouth and around lips.  Neck: +supple, no tenderness/meningismus,  Pulm: Bilateral BS, normal resp effort, no wheeze/stridor/retractions  CV: S1S2  Abd: soft, NT/ND  Mskel: no edema/erythema/cyanosis  Skin: no rash, warm/dry. + abrasion and ecchymosis  to left temporal area  Neuro: AAOx3, no sensory/motor deficits 98

## 2021-01-27 ENCOUNTER — APPOINTMENT (OUTPATIENT)
Dept: ORTHOPEDIC SURGERY | Facility: CLINIC | Age: 56
End: 2021-01-27
Payer: COMMERCIAL

## 2021-01-27 VITALS
WEIGHT: 187 LBS | DIASTOLIC BLOOD PRESSURE: 83 MMHG | HEART RATE: 72 BPM | SYSTOLIC BLOOD PRESSURE: 144 MMHG | BODY MASS INDEX: 31.92 KG/M2 | HEIGHT: 64 IN

## 2021-01-27 DIAGNOSIS — Z78.9 OTHER SPECIFIED HEALTH STATUS: ICD-10-CM

## 2021-01-27 DIAGNOSIS — F17.200 NICOTINE DEPENDENCE, UNSPECIFIED, UNCOMPLICATED: ICD-10-CM

## 2021-01-27 PROCEDURE — 99214 OFFICE O/P EST MOD 30 MIN: CPT

## 2021-01-27 PROCEDURE — 99072 ADDL SUPL MATRL&STAF TM PHE: CPT

## 2021-01-27 RX ORDER — MELOXICAM 15 MG/1
15 TABLET ORAL DAILY
Qty: 14 | Refills: 0 | Status: ACTIVE | COMMUNITY
Start: 2021-01-27 | End: 1900-01-01

## 2021-01-27 RX ORDER — DICLOFENAC SODIUM 1% 10 MG/G
1 GEL TOPICAL
Qty: 1 | Refills: 0 | Status: ACTIVE | COMMUNITY
Start: 2021-01-27 | End: 1900-01-01

## 2021-02-10 ENCOUNTER — APPOINTMENT (OUTPATIENT)
Dept: ORTHOPEDIC SURGERY | Facility: CLINIC | Age: 56
End: 2021-02-10
Payer: COMMERCIAL

## 2021-02-10 VITALS — TEMPERATURE: 95 F

## 2021-02-10 VITALS
WEIGHT: 187 LBS | HEART RATE: 59 BPM | HEIGHT: 64 IN | SYSTOLIC BLOOD PRESSURE: 157 MMHG | BODY MASS INDEX: 31.92 KG/M2 | DIASTOLIC BLOOD PRESSURE: 78 MMHG

## 2021-02-10 PROCEDURE — 20550 NJX 1 TENDON SHEATH/LIGAMENT: CPT | Mod: RT

## 2021-02-10 PROCEDURE — 99214 OFFICE O/P EST MOD 30 MIN: CPT | Mod: 25

## 2021-02-10 PROCEDURE — 99072 ADDL SUPL MATRL&STAF TM PHE: CPT

## 2021-02-10 RX ORDER — MELOXICAM 15 MG/1
15 TABLET ORAL DAILY
Qty: 14 | Refills: 0 | Status: ACTIVE | COMMUNITY
Start: 2021-02-10 | End: 1900-01-01

## 2021-02-10 RX ORDER — METHYLPREDNISOLONE ACETATE 40 MG/ML
40 INJECTION, SUSPENSION INTRA-ARTICULAR; INTRALESIONAL; INTRAMUSCULAR; SOFT TISSUE
Qty: 0.5 | Refills: 0 | Status: ACTIVE | COMMUNITY
Start: 2021-02-10

## 2021-03-19 ENCOUNTER — APPOINTMENT (OUTPATIENT)
Dept: ORTHOPEDIC SURGERY | Facility: CLINIC | Age: 56
End: 2021-03-19
Payer: OTHER MISCELLANEOUS

## 2021-03-19 VITALS
BODY MASS INDEX: 31.41 KG/M2 | SYSTOLIC BLOOD PRESSURE: 138 MMHG | HEIGHT: 64 IN | HEART RATE: 65 BPM | WEIGHT: 184 LBS | DIASTOLIC BLOOD PRESSURE: 82 MMHG

## 2021-03-19 DIAGNOSIS — M17.11 UNILATERAL PRIMARY OSTEOARTHRITIS, RIGHT KNEE: ICD-10-CM

## 2021-03-19 PROCEDURE — 73564 X-RAY EXAM KNEE 4 OR MORE: CPT | Mod: RT

## 2021-03-19 PROCEDURE — 99072 ADDL SUPL MATRL&STAF TM PHE: CPT

## 2021-03-19 PROCEDURE — 99204 OFFICE O/P NEW MOD 45 MIN: CPT | Mod: 25

## 2021-03-19 PROCEDURE — 20610 DRAIN/INJ JOINT/BURSA W/O US: CPT | Mod: RT

## 2021-03-19 RX ORDER — MELOXICAM 7.5 MG/1
7.5 TABLET ORAL
Qty: 60 | Refills: 0 | Status: ACTIVE | COMMUNITY
Start: 2021-03-19 | End: 1900-01-01

## 2021-03-19 NOTE — PROCEDURE
[de-identified] : Using sterile technique, 2cc of depomedrol 40mg/ml, 4cc of 1% plain lidocaine, and 2 cc 0.25% marcaine was drawn up into a sterile syringe. The right knee was then sterilely prepped with chlorhexidine. Ethyl chloride spray was used to anesthetize the skin and subQ tissue. The depomedrol/lidocaine/marcaine mixture was then injected into the knee joint in the anterolateral position. The patient tolerated the procedure well without difficulty. The patient was given instructions on the use of ice and anti-inflammatories post injection site soreness.\par

## 2021-03-19 NOTE — PHYSICAL EXAM
[de-identified] : The patient appears well nourished  and in no apparent distress.  The patient is alert and oriented to person, place, and time.   Affect and mood appear normal. The head is normocephalic and atraumatic.  The eyes reveal normal sclera and extra ocular muscles are intact. The tongue is midline with no apparent lesions.  Skin shows normal turgor with no evidence of eczema or psoriasis.  No respiratory distress noted.  Sensation grossly intact.\par   [de-identified] : Exam of the right knee shows a minimal effusion, full extension, medial joint line tenderness to palpation, no pain with palpation over the MCL insertions, no pain with valgus stress testing, ligament exam otherwise stable, flexion of 130 degrees. 5/5 motor strength bilaterally distally. Sensation intact distally. \par  [de-identified] : Xray- 4 views of the right knee shows moderate medial compartment arthritis of the right knee.

## 2021-03-19 NOTE — ADDENDUM
[FreeTextEntry1] : This note was authored by Yasmani Guzman working as a medical scribe for Dr. Jam Chance. The note was reviewed, edited, and revised by Dr. Jam Chance whom is in agreement with the exam findings, imaging findings, and treatment plan. 03/19/2021.

## 2021-03-19 NOTE — DISCUSSION/SUMMARY
[de-identified] : The patient is a 55 year old female with moderate medial compartment arthritis of the right knee exacerbated by a recent fall. Conservative options were discussed. She was given a cortisone injection in the right knee today. She was given a prescription for physical therapy and anti-inflammatories. I recommended a course of Mobic. The patient was given a prescription for the Mobic with directions. She was instructed to stop the medicine and call the office if there are any adverse reaction to the medicine. She was also instructed to consult with their primary care doctor prior to starting the medication. She may follow up as needed.

## 2021-03-19 NOTE — HISTORY OF PRESENT ILLNESS
[de-identified] : PRITI is a 55 year old female who presents today for initial evaluation of posterior right knee pain.  This is a workers comp case.  Patient was walking in the parking lot where they had been redoing the asphalt when she tripped and twisted/fell on the right knee.  Since then her right knee has become very swollen and tender.  Her pain is localized medially and posteriorly and is worse with all weightbearing activity including walking any distance, standing frame of time, and rising from seated position.  She also admits to history of her knee giving out since this fall.  She admits that she did have some pain in the knee prior but has not had any injections or physical therapy today.  She has been taking Tylenol and over-the-counter anti-inflammatories without relief.  Patient MRI from 2019 which she presents today with.\par

## 2022-01-05 ENCOUNTER — APPOINTMENT (OUTPATIENT)
Dept: ORTHOPEDIC SURGERY | Facility: CLINIC | Age: 57
End: 2022-01-05

## 2022-01-12 ENCOUNTER — APPOINTMENT (OUTPATIENT)
Dept: ORTHOPEDIC SURGERY | Facility: CLINIC | Age: 57
End: 2022-01-12
Payer: COMMERCIAL

## 2022-01-12 VITALS
BODY MASS INDEX: 31.41 KG/M2 | DIASTOLIC BLOOD PRESSURE: 76 MMHG | WEIGHT: 184 LBS | SYSTOLIC BLOOD PRESSURE: 156 MMHG | HEIGHT: 64 IN | HEART RATE: 66 BPM

## 2022-01-12 PROCEDURE — 99213 OFFICE O/P EST LOW 20 MIN: CPT | Mod: 25

## 2022-01-12 PROCEDURE — 20550 NJX 1 TENDON SHEATH/LIGAMENT: CPT | Mod: RT

## 2022-01-12 PROCEDURE — 73110 X-RAY EXAM OF WRIST: CPT | Mod: RT

## 2022-01-12 RX ORDER — METHYLPREDNISOLONE ACETATE 40 MG/ML
40 INJECTION, SUSPENSION INTRA-ARTICULAR; INTRALESIONAL; INTRAMUSCULAR; SOFT TISSUE
Qty: 0.5 | Refills: 0 | Status: ACTIVE | COMMUNITY
Start: 2022-01-12

## 2022-01-19 ENCOUNTER — APPOINTMENT (OUTPATIENT)
Dept: ORTHOPEDIC SURGERY | Facility: CLINIC | Age: 57
End: 2022-01-19
Payer: COMMERCIAL

## 2022-01-19 DIAGNOSIS — M65.4 RADIAL STYLOID TENOSYNOVITIS [DE QUERVAIN]: ICD-10-CM

## 2022-01-19 PROCEDURE — 99213 OFFICE O/P EST LOW 20 MIN: CPT

## 2022-01-19 RX ORDER — MELOXICAM 15 MG/1
15 TABLET ORAL DAILY
Qty: 14 | Refills: 0 | Status: ACTIVE | COMMUNITY
Start: 2022-01-19 | End: 1900-01-01

## 2022-01-19 RX ORDER — DICLOFENAC SODIUM 3 G/100G
3 GEL TOPICAL TWICE DAILY
Qty: 1 | Refills: 0 | Status: ACTIVE | COMMUNITY
Start: 2022-01-19 | End: 1900-01-01

## 2022-04-14 ENCOUNTER — APPOINTMENT (OUTPATIENT)
Dept: ORTHOPEDIC SURGERY | Facility: CLINIC | Age: 57
End: 2022-04-14
Payer: COMMERCIAL

## 2022-04-14 VITALS
WEIGHT: 170 LBS | HEART RATE: 68 BPM | SYSTOLIC BLOOD PRESSURE: 157 MMHG | BODY MASS INDEX: 29.02 KG/M2 | TEMPERATURE: 97.1 F | HEIGHT: 64 IN | DIASTOLIC BLOOD PRESSURE: 83 MMHG

## 2022-04-14 DIAGNOSIS — M25.552 PAIN IN RIGHT HIP: ICD-10-CM

## 2022-04-14 DIAGNOSIS — R29.898 OTHER SYMPTOMS AND SIGNS INVOLVING THE MUSCULOSKELETAL SYSTEM: ICD-10-CM

## 2022-04-14 DIAGNOSIS — M25.559 PAIN IN UNSPECIFIED HIP: ICD-10-CM

## 2022-04-14 DIAGNOSIS — M25.551 PAIN IN RIGHT HIP: ICD-10-CM

## 2022-04-14 PROCEDURE — 99214 OFFICE O/P EST MOD 30 MIN: CPT

## 2022-04-14 PROCEDURE — 73523 X-RAY EXAM HIPS BI 5/> VIEWS: CPT

## 2022-04-14 NOTE — DISCUSSION/SUMMARY
[de-identified] : More than 30 minutes was spent reviewing the x-rays as well as discussing with the patient their clinical presentation, diagnosis and providing education.  The patient's primary complaint is leg weakness after walking.  She does not report of any history of lower back pain.  The patient is able to flex all the way down to the floor without any noted discomfort.  The patient did not have any significant weakness found on physical exam.  The patient reports of having EMGs of the lower extremities about 3 years ago and the findings were normal.  This was done because of leg swelling.  At this time the patient's origin of her complaint does not appear to be orthopedic in nature.  She is recommended neurology valuation and work-up for complaint.  The patient demonstrated her understanding of the treatment plan.  All questions were answered to the patient's satisfaction. Statement Selected

## 2022-04-14 NOTE — PHYSICAL EXAM
[de-identified] : The patient appears well nourished and in no apparent distress. The patient is alert and oriented to person, place, and time. Affect and mood appear normal. The head is normocephalic and atraumatic. Skin shows normal turgor with no evidence of eczema or psoriasis. No respiratory distress noted. Sensation grossly intact. MUSCULOSKELETAL:   SEE BELOW\par \par Bilateral hip exam demonstrates equal leg lengths.  There is no tenderness of either hip to palpation.  There is full passive range of motion of both hips.  Hip flexion is approximately 80 degrees bilaterally.  Internal rotation of 10 degrees and external rotation of 50 degrees.  Hip abduction is 30 degrees and hip adduction is 15 degrees.  There is 4+/5 motor strength of the right knee against resisted extension.  Motor function of both extremities is otherwise 5/5 without any other focal weakness found.  DTRs of the patella is 2+ and brisk.  No tremor or clonus is appreciated.  Sensation to light touch is normal.  2+ DP pulse.  No venous stasis.  No ulceration.  Musculature appears symmetrical.  Gait is steady without use of devices.  No antalgic limp.  Patient is able to single-leg heel lift on both sides.  Patient is able to balance on 1 foot without any noted deficit. [de-identified] : Bilateral hip and pelvis x-rays were reviewed.  Bilateral hip x-rays do not demonstrate any joint space narrowing.  No lytic lesions.  No AVN is appreciated.  No retained hardware.  No fractures.

## 2022-04-14 NOTE — HISTORY OF PRESENT ILLNESS
[de-identified] : Patient presents today for initial evaluation of bilateral leg weakness.  The patient reports over the past month she has had weakness when she goes to walk from a standing position.  She does not have any hip pain.  This is not associated with lower back pain.  She does not have a specific reason as to why she is having this sensation.  She feels after walking a couple steps that her legs want to buckle.  She denies of any traumatic events.  She does report of intermittent headache.  She unclearly discusses some visual changes that are intermittent.  She reports that she has a history of discoid lupus.  She does not know of any other rheumatological disorders.  She has not had any target lesions consistent with Lyme's.  She denies of any dizziness.  She is not using a cane or a walker.  This current complaint does not require taking any medication.\par \par Review of Systems-\par Constitutional: No fever or chills. \par Cardiovascular: No orthopnea or chest pain\par Pulmonary: No shortness of breath. \par GI: No nausea or vomiting or abdominal pain.\par Musculoskeletal: see HPI \par Psychiatric: No anxiety and depression.

## 2022-04-27 PROBLEM — M25.551 BILATERAL HIP PAIN: Status: ACTIVE | Noted: 2022-04-14

## 2022-07-06 ENCOUNTER — APPOINTMENT (OUTPATIENT)
Dept: ORTHOPEDIC SURGERY | Facility: CLINIC | Age: 57
End: 2022-07-06

## 2023-11-28 ENCOUNTER — APPOINTMENT (OUTPATIENT)
Dept: ORTHOPEDIC SURGERY | Facility: CLINIC | Age: 58
End: 2023-11-28

## 2023-11-30 ENCOUNTER — APPOINTMENT (OUTPATIENT)
Dept: ORTHOPEDIC SURGERY | Facility: CLINIC | Age: 58
End: 2023-11-30
Payer: COMMERCIAL

## 2023-11-30 ENCOUNTER — NON-APPOINTMENT (OUTPATIENT)
Age: 58
End: 2023-11-30

## 2023-11-30 VITALS — HEIGHT: 64 IN | WEIGHT: 178 LBS | BODY MASS INDEX: 30.39 KG/M2

## 2023-11-30 DIAGNOSIS — S93.402A SPRAIN OF UNSPECIFIED LIGAMENT OF LEFT ANKLE, INITIAL ENCOUNTER: ICD-10-CM

## 2023-11-30 PROCEDURE — 99203 OFFICE O/P NEW LOW 30 MIN: CPT

## 2023-12-15 ENCOUNTER — APPOINTMENT (OUTPATIENT)
Dept: ORTHOPEDIC SURGERY | Facility: CLINIC | Age: 58
End: 2023-12-15
Payer: COMMERCIAL

## 2023-12-15 DIAGNOSIS — M25.572 PAIN IN LEFT ANKLE AND JOINTS OF LEFT FOOT: ICD-10-CM

## 2023-12-15 DIAGNOSIS — M19.072 PRIMARY OSTEOARTHRITIS, LEFT ANKLE AND FOOT: ICD-10-CM

## 2023-12-15 PROCEDURE — 99446 NTRPROF PH1/NTRNET/EHR 5-10: CPT

## 2024-01-08 ENCOUNTER — OUTPATIENT (OUTPATIENT)
Dept: OUTPATIENT SERVICES | Facility: HOSPITAL | Age: 59
LOS: 1 days | End: 2024-01-08

## 2024-01-08 ENCOUNTER — RESULT REVIEW (OUTPATIENT)
Age: 59
End: 2024-01-08

## 2024-01-08 ENCOUNTER — APPOINTMENT (OUTPATIENT)
Dept: ULTRASOUND IMAGING | Facility: CLINIC | Age: 59
End: 2024-01-08
Payer: COMMERCIAL

## 2024-01-08 DIAGNOSIS — M19.072 PRIMARY OSTEOARTHRITIS, LEFT ANKLE AND FOOT: ICD-10-CM

## 2024-01-08 DIAGNOSIS — Z98.890 OTHER SPECIFIED POSTPROCEDURAL STATES: Chronic | ICD-10-CM

## 2024-01-08 PROCEDURE — 20606 DRAIN/INJ JOINT/BURSA W/US: CPT | Mod: LT

## 2024-11-27 ENCOUNTER — APPOINTMENT (OUTPATIENT)
Dept: ORTHOPEDIC SURGERY | Facility: CLINIC | Age: 59
End: 2024-11-27
Payer: COMMERCIAL

## 2024-11-27 VITALS — HEIGHT: 64 IN | WEIGHT: 178 LBS | BODY MASS INDEX: 30.39 KG/M2

## 2024-11-27 DIAGNOSIS — M17.11 UNILATERAL PRIMARY OSTEOARTHRITIS, RIGHT KNEE: ICD-10-CM

## 2024-11-27 DIAGNOSIS — M25.561 PAIN IN RIGHT KNEE: ICD-10-CM

## 2024-11-27 PROCEDURE — 99204 OFFICE O/P NEW MOD 45 MIN: CPT

## 2024-11-27 PROCEDURE — 73562 X-RAY EXAM OF KNEE 3: CPT | Mod: RT

## 2024-11-27 RX ORDER — METHYLPREDNISOLONE 4 MG/1
4 TABLET ORAL
Qty: 1 | Refills: 0 | Status: ACTIVE | COMMUNITY
Start: 2024-11-27 | End: 1900-01-01

## 2024-11-27 NOTE — HISTORY OF PRESENT ILLNESS
[Sudden] : sudden [Dull/Aching] : dull/aching [Throbbing] : throbbing [Intermittent] : intermittent [Household chores] : household chores [Leisure] : leisure [Social interactions] : social interactions [Rest] : rest [10] : 10 [0] : 0 [Burning] : burning [Sharp] : sharp [Stabbing] : stabbing [Full time] : Work status: full time [de-identified] : Patient here for right knee. Patient states NKI. Patient states pain started 1 month ago. Patient states pain is sharp, dull, stabbing, throbbing and aching with weight bearing and ROM in the posterior aspect of the knee.  [] : Post Surgical Visit: no [FreeTextEntry1] : Right knee [FreeTextEntry3] : 1 month ago  [FreeTextEntry5] : NKI  [de-identified] : Movement  [de-identified] :

## 2024-11-27 NOTE — ASSESSMENT
[FreeTextEntry1] : 59yoF with moderate to severe right knee DJD with posterior knee pain.  Recommend nonsurgical management.  -rx for PT -Activities as tolerated -Rest, ice, compression, elevation, NSAIDs PRN for pain.  -All questions answered -F/u 6 weeks  The diagnosis was explained in detail. The potential non-surgical and surgical treatments were reviewed. The relative risks and benefits of each option were considered relative to the patients age, activity level, medical history, symptom severity and previously attempted treatments.  The patient was advised to consult with their primary medical provider prior to initiation of any new medications to reduce the risk of adverse effects specific to their long-term home medications and medical history. The risk of gastrointestinal irritation and kidney injury specific to long-term NSAID use was discussed.

## 2024-11-27 NOTE — PHYSICAL EXAM
[de-identified] : Examination of the right knee is as follows: INSPECTION: mild effusion, but no ecchymosis, no erythema, no atrophy, no deformities of quad tendon or of patellar tendon PALPATION: medial joint line tenderness, but no palpable mass, no obvious defects, no increased warmth, no calf tenderness ROM: flexion 125 degrees, but extension 0 degrees STRENGTH: quadriceps 5/5, hamstring 5/5 TESTING: ligamentously stable NEURO: light touch is intact throughout GAIT: antalgic, but patient ambulates without assistive device  X-rays of the right knee is as follows:  Knee 3 view in the anteroposterior, lateral, skyline views: moderate to severe tricompartmental OA with medial joint space narrowing

## 2025-01-08 ENCOUNTER — APPOINTMENT (OUTPATIENT)
Dept: ORTHOPEDIC SURGERY | Facility: CLINIC | Age: 60
End: 2025-01-08

## 2025-02-04 ENCOUNTER — NON-APPOINTMENT (OUTPATIENT)
Age: 60
End: 2025-02-04

## 2025-05-15 NOTE — ASU PREOP CHECKLIST - IDENTIFICATION BAND VERIFIED
Spoke to Audiology/PCR at [facility], and they will remake cshells and send them without me having to send the current cshells in.  They will put slightly longer double twist removal filaments with larger balls on them.  They will also remove a small amount of material so the fit will not be as snug, and they will change the  in the left cshell from a #0 to #1.    
done